# Patient Record
Sex: MALE | Race: WHITE | NOT HISPANIC OR LATINO | Employment: FULL TIME | URBAN - METROPOLITAN AREA
[De-identification: names, ages, dates, MRNs, and addresses within clinical notes are randomized per-mention and may not be internally consistent; named-entity substitution may affect disease eponyms.]

---

## 2017-08-15 ENCOUNTER — OFFICE VISIT (OUTPATIENT)
Dept: FAMILY MEDICINE | Facility: CLINIC | Age: 22
End: 2017-08-15
Payer: COMMERCIAL

## 2017-08-15 VITALS
DIASTOLIC BLOOD PRESSURE: 68 MMHG | OXYGEN SATURATION: 98 % | BODY MASS INDEX: 17.26 KG/M2 | HEIGHT: 71 IN | SYSTOLIC BLOOD PRESSURE: 110 MMHG | TEMPERATURE: 98 F | HEART RATE: 86 BPM | WEIGHT: 123.25 LBS | RESPIRATION RATE: 17 BRPM

## 2017-08-15 DIAGNOSIS — J02.9 PHARYNGITIS, UNSPECIFIED ETIOLOGY: ICD-10-CM

## 2017-08-15 DIAGNOSIS — E78.5 HYPERLIPIDEMIA, UNSPECIFIED HYPERLIPIDEMIA TYPE: Primary | ICD-10-CM

## 2017-08-15 LAB
BASOPHILS # BLD AUTO: 0.03 K/UL
BASOPHILS NFR BLD: 0.6 %
DIFFERENTIAL METHOD: NORMAL
EOSINOPHIL # BLD AUTO: 0.1 K/UL
EOSINOPHIL NFR BLD: 1.2 %
ERYTHROCYTE [DISTWIDTH] IN BLOOD BY AUTOMATED COUNT: 13.7 %
HCT VFR BLD AUTO: 48.2 %
HGB BLD-MCNC: 16 G/DL
LYMPHOCYTES # BLD AUTO: 1.4 K/UL
LYMPHOCYTES NFR BLD: 28.1 %
MCH RBC QN AUTO: 28.7 PG
MCHC RBC AUTO-ENTMCNC: 33.2 G/DL
MCV RBC AUTO: 87 FL
MONOCYTES # BLD AUTO: 0.6 K/UL
MONOCYTES NFR BLD: 12.3 %
NEUTROPHILS # BLD AUTO: 2.8 K/UL
NEUTROPHILS NFR BLD: 57.6 %
PLATELET # BLD AUTO: 279 K/UL
PMV BLD AUTO: 11.4 FL
RBC # BLD AUTO: 5.57 M/UL
WBC # BLD AUTO: 4.81 K/UL

## 2017-08-15 PROCEDURE — 80053 COMPREHEN METABOLIC PANEL: CPT

## 2017-08-15 PROCEDURE — 36415 COLL VENOUS BLD VENIPUNCTURE: CPT | Mod: S$GLB,,, | Performed by: INTERNAL MEDICINE

## 2017-08-15 PROCEDURE — 80061 LIPID PANEL: CPT

## 2017-08-15 PROCEDURE — 3008F BODY MASS INDEX DOCD: CPT | Mod: S$GLB,,, | Performed by: INTERNAL MEDICINE

## 2017-08-15 PROCEDURE — 85025 COMPLETE CBC W/AUTO DIFF WBC: CPT

## 2017-08-15 PROCEDURE — 86695 HERPES SIMPLEX TYPE 1 TEST: CPT

## 2017-08-15 PROCEDURE — 99213 OFFICE O/P EST LOW 20 MIN: CPT | Mod: S$GLB,,, | Performed by: INTERNAL MEDICINE

## 2017-08-15 PROCEDURE — 87070 CULTURE OTHR SPECIMN AEROBIC: CPT

## 2017-08-15 NOTE — PROGRESS NOTES
Subjective:       Patient ID: Juliano Villanueva is a 22 y.o. male.    No current outpatient prescriptions on file.     No current facility-administered medications for this visit.      Chief Complaint: Annual Exam and Pain when swallowing, abx x 5 days  He is here today to an annual exam and discuss his sore throat.     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  He is doing well and tolerated living in [facility] well.      He has mildly elevated lipids checked on 8/2016 were 222/92/45/158. He is not currently on treatment.     He does reports starting with sore throat while still in [facility] 10 days ago. He returned after 4 days to the [facility] and was seen in Urgent Care.  His throat swab was negative but he was given abx to take anyway. He says he has been on abx now for 5 days and had symptoms for 10 days and it is still not improved. He has noticed lesion and sores in his mouth and in his gums. He denies any cold symptoms. No coughing. No ear pain. No fevers.  He has pain with swallowing.  He says it is not getting any better.     Review of Systems   Constitutional: Negative for appetite change, fatigue, fever and unexpected weight change.   HENT: Positive for sore throat. Negative for congestion, ear pain, hearing loss and trouble swallowing.    Eyes: Negative for pain and visual disturbance.   Respiratory: Negative for cough, chest tightness, shortness of breath and wheezing.    Cardiovascular: Negative for chest pain, palpitations and leg swelling.   Gastrointestinal: Negative for abdominal pain, blood in stool, constipation, diarrhea, nausea and vomiting.   Endocrine: Negative for polyuria.   Genitourinary: Negative for dysuria and hematuria.   Musculoskeletal: Negative for arthralgias, back pain and myalgias.   Skin: Negative for rash.   Neurological: Negative for dizziness, weakness, numbness and headaches.   Hematological: Does not bruise/bleed easily.   Psychiatric/Behavioral: Negative for  "dysphoric mood, sleep disturbance and suicidal ideas. The patient is not nervous/anxious.        Objective:      Vitals:    08/15/17 1113   BP: 110/68   BP Location: Right arm   Patient Position: Sitting   BP Method: Medium (Manual)   Pulse: 86   Resp: 17   Temp: 98.2 °F (36.8 °C)   TempSrc: Oral   SpO2: 98%   Weight: 55.9 kg (123 lb 3.8 oz)   Height: 5' 11" (1.803 m)     Body mass index is 17.19 kg/m².  Physical Exam    General appearance: No acute distress, cooperative  Eyes: PERRL, EOMI, conjunctiva clear  Ears: normal external ear and pinna, tm clear without drainage, canals clear  Nose: Normal mucosa without drainage  Throat: no exudates but moderate erythema, tonsils not enlarged  Mouth: ulcers on his tonsils, buccal mucosa (none on lips), Moist mucous membranes  Neck: FROM, soft, supple, no thyromegaly, no bruits  Lymph: tender b/l  anterior adenopathy but no posterior cervical adenopathy  Heart::  Regular rate and rhythm, no murmur  Lung: Clear to ascultation bilaterally, no wheezing, no rales, no rhonchi, no distress  Abdomen: Soft, nontender, no distention, no hepatosplenomegaly, bowel sounds normal, no guarding, no rebound, no peritoneal signs  Skin: no rashes, no lesions  Extremities: no edema, no cyanosis  Neuro: CN 2-12 intact, 5/5 muscle strength upper and lower extremity bilaterally, 2+ DTRs UE and LE bilaterally, normal gait, normal sensation  Peripheral pulses: 2+ pedal pulses bilaterally, good perfusion and color  Musculoskeletal: FROM, good strenth, no tenderness  Joint: normal appearance, no swelling, no warmth, no deformity in all joints    Assessment:       1. Hyperlipidemia, unspecified hyperlipidemia type    2. Pharyngitis, unspecified etiology        Plan:       Hyperlipidemia, unspecified hyperlipidemia type  Noted to be elevated in the past and will recheck today.   -     Lipid panel  -     Elena-Barr Virus antibody panel    Pharyngitis, unspecified etiology  Viral etiology that is not " improving after 10 days. Concern for HSV and EBV.  No lesions on his hands or feet and symptoms not improving after 10 days make coxsackie, adenovirus or enterovius less likely.  Will send culture of tonsils but I think this will be negative.  Okay to stop taking abx.  If no improvement and workup is negative then referral to ENT for laryngoscope.    -     CBC auto differential  -     Comprehensive metabolic panel  -     Herpes simplex type 1&2 IgG,Herpes titer  -     HERPES SIMPLEX 1 & 2 IGM  -     Throat culture    Return if symptoms worsen or fail to improve, for he will call if not improved over the nex couple days.

## 2017-08-16 LAB
ALBUMIN SERPL BCP-MCNC: 4.2 G/DL
ALP SERPL-CCNC: 65 U/L
ALT SERPL W/O P-5'-P-CCNC: 68 U/L
ANION GAP SERPL CALC-SCNC: 10 MMOL/L
AST SERPL-CCNC: 56 U/L
BILIRUB SERPL-MCNC: 2.1 MG/DL
BUN SERPL-MCNC: 10 MG/DL
CALCIUM SERPL-MCNC: 9.5 MG/DL
CHLORIDE SERPL-SCNC: 104 MMOL/L
CHOLEST/HDLC SERPL: 7.6 {RATIO}
CO2 SERPL-SCNC: 26 MMOL/L
CREAT SERPL-MCNC: 0.9 MG/DL
EST. GFR  (AFRICAN AMERICAN): >60 ML/MIN/1.73 M^2
EST. GFR  (NON AFRICAN AMERICAN): >60 ML/MIN/1.73 M^2
GLUCOSE SERPL-MCNC: 80 MG/DL
HDL/CHOLESTEROL RATIO: 13.2 %
HDLC SERPL-MCNC: 287 MG/DL
HDLC SERPL-MCNC: 38 MG/DL
LDLC SERPL CALC-MCNC: 224.6 MG/DL
NONHDLC SERPL-MCNC: 249 MG/DL
POTASSIUM SERPL-SCNC: 4.1 MMOL/L
PROT SERPL-MCNC: 7.8 G/DL
SODIUM SERPL-SCNC: 140 MMOL/L
TRIGL SERPL-MCNC: 122 MG/DL

## 2017-08-18 LAB — BACTERIA THROAT CULT: NORMAL

## 2017-08-21 ENCOUNTER — TELEPHONE (OUTPATIENT)
Dept: FAMILY MEDICINE | Facility: CLINIC | Age: 22
End: 2017-08-21

## 2017-08-21 DIAGNOSIS — R79.89 ELEVATED LFTS: Primary | ICD-10-CM

## 2017-08-21 DIAGNOSIS — E78.5 HYPERLIPIDEMIA, UNSPECIFIED HYPERLIPIDEMIA TYPE: ICD-10-CM

## 2017-08-21 RX ORDER — ATORVASTATIN CALCIUM 20 MG/1
20 TABLET, FILM COATED ORAL DAILY
Qty: 90 TABLET | Refills: 3 | Status: SHIPPED | OUTPATIENT
Start: 2017-08-21 | End: 2018-05-08

## 2017-08-21 NOTE — TELEPHONE ENCOUNTER
I called him with his labs results.     He needs to get a cmp in one week .     He needs fasting labs with cmp in 3 months.     Can you call to schedule please.     Thanks

## 2017-08-22 LAB
HSV1 IGG SERPL QL IA: NEGATIVE
HSV2 IGG SERPL QL IA: NEGATIVE

## 2017-08-28 ENCOUNTER — LAB VISIT (OUTPATIENT)
Dept: LAB | Facility: HOSPITAL | Age: 22
End: 2017-08-28
Attending: INTERNAL MEDICINE
Payer: COMMERCIAL

## 2017-08-28 DIAGNOSIS — R79.89 ELEVATED LFTS: ICD-10-CM

## 2017-08-28 LAB
ALBUMIN SERPL BCP-MCNC: 3.9 G/DL
ALP SERPL-CCNC: 67 U/L
ALT SERPL W/O P-5'-P-CCNC: 31 U/L
ANION GAP SERPL CALC-SCNC: 7 MMOL/L
AST SERPL-CCNC: 25 U/L
BILIRUB SERPL-MCNC: 1.4 MG/DL
BUN SERPL-MCNC: 15 MG/DL
CALCIUM SERPL-MCNC: 9.9 MG/DL
CHLORIDE SERPL-SCNC: 106 MMOL/L
CO2 SERPL-SCNC: 29 MMOL/L
CREAT SERPL-MCNC: 1 MG/DL
EST. GFR  (AFRICAN AMERICAN): >60 ML/MIN/1.73 M^2
EST. GFR  (NON AFRICAN AMERICAN): >60 ML/MIN/1.73 M^2
GLUCOSE SERPL-MCNC: 82 MG/DL
POTASSIUM SERPL-SCNC: 4.7 MMOL/L
PROT SERPL-MCNC: 7.9 G/DL
SODIUM SERPL-SCNC: 142 MMOL/L

## 2017-08-28 PROCEDURE — 36415 COLL VENOUS BLD VENIPUNCTURE: CPT | Mod: PO

## 2017-08-28 PROCEDURE — 80053 COMPREHEN METABOLIC PANEL: CPT

## 2017-08-29 ENCOUNTER — TELEPHONE (OUTPATIENT)
Dept: FAMILY MEDICINE | Facility: CLINIC | Age: 22
End: 2017-08-29

## 2017-08-29 NOTE — TELEPHONE ENCOUNTER
Please let him know that his liver function is back to normal.     Some of his viral labs are still pending (can we check on them).        .Thanks

## 2017-08-30 NOTE — TELEPHONE ENCOUNTER
Spoke to Thelma with lab, states specimens were never sent out for resulting, unable to use frozen serum as it is outside of timeframe (per immunology, [place] will not accept stored specimen). Please advise if patient needs redraw.

## 2017-09-06 ENCOUNTER — TELEPHONE (OUTPATIENT)
Dept: FAMILY MEDICINE | Facility: CLINIC | Age: 22
End: 2017-09-06

## 2017-09-06 NOTE — TELEPHONE ENCOUNTER
----- Message from Torrie Kaplan sent at 9/6/2017  1:30 PM CDT -----  Contact: self  Patient is calling for LAB results. Please call patient at [phone]. Thanks!

## 2017-09-07 NOTE — TELEPHONE ENCOUNTER
I called and left him a message.     Okay his liver function is back to normal.  No evidence of HSV.  His labs for CMV and EBV were not done by the lab.     I feel his illness was viral and am happy that this has passed.  He has no residual problems on his labs.      He needs to be taking atorvastatin for his cholesterol and will recheck in 3 months which is already scheduled.     Thanks

## 2017-09-12 ENCOUNTER — PATIENT MESSAGE (OUTPATIENT)
Dept: FAMILY MEDICINE | Facility: CLINIC | Age: 22
End: 2017-09-12

## 2017-09-12 DIAGNOSIS — B34.9 VIRAL ILLNESS: Primary | ICD-10-CM

## 2017-09-13 ENCOUNTER — LAB VISIT (OUTPATIENT)
Dept: LAB | Facility: HOSPITAL | Age: 22
End: 2017-09-13
Attending: INTERNAL MEDICINE
Payer: COMMERCIAL

## 2017-09-13 DIAGNOSIS — B34.9 VIRAL ILLNESS: ICD-10-CM

## 2017-09-13 LAB
ALBUMIN SERPL BCP-MCNC: 4 G/DL
ALP SERPL-CCNC: 63 U/L
ALT SERPL W/O P-5'-P-CCNC: 32 U/L
ANION GAP SERPL CALC-SCNC: 10 MMOL/L
AST SERPL-CCNC: 28 U/L
BASOPHILS # BLD AUTO: 0.03 K/UL
BASOPHILS NFR BLD: 0.5 %
BILIRUB SERPL-MCNC: 2.8 MG/DL
BUN SERPL-MCNC: 20 MG/DL
CALCIUM SERPL-MCNC: 9.6 MG/DL
CHLORIDE SERPL-SCNC: 102 MMOL/L
CO2 SERPL-SCNC: 27 MMOL/L
CREAT SERPL-MCNC: 1 MG/DL
DIFFERENTIAL METHOD: NORMAL
EOSINOPHIL # BLD AUTO: 0.2 K/UL
EOSINOPHIL NFR BLD: 3.1 %
ERYTHROCYTE [DISTWIDTH] IN BLOOD BY AUTOMATED COUNT: 13.1 %
EST. GFR  (AFRICAN AMERICAN): >60 ML/MIN/1.73 M^2
EST. GFR  (NON AFRICAN AMERICAN): >60 ML/MIN/1.73 M^2
GLUCOSE SERPL-MCNC: 105 MG/DL
HCT VFR BLD AUTO: 42.2 %
HGB BLD-MCNC: 14.5 G/DL
LYMPHOCYTES # BLD AUTO: 1.8 K/UL
LYMPHOCYTES NFR BLD: 27.1 %
MCH RBC QN AUTO: 28.6 PG
MCHC RBC AUTO-ENTMCNC: 34.4 G/DL
MCV RBC AUTO: 83 FL
MONOCYTES # BLD AUTO: 0.7 K/UL
MONOCYTES NFR BLD: 10.8 %
NEUTROPHILS # BLD AUTO: 3.8 K/UL
NEUTROPHILS NFR BLD: 58 %
PLATELET # BLD AUTO: 215 K/UL
PMV BLD AUTO: 12.3 FL
POTASSIUM SERPL-SCNC: 4.1 MMOL/L
PROT SERPL-MCNC: 7.2 G/DL
RBC # BLD AUTO: 5.07 M/UL
SODIUM SERPL-SCNC: 139 MMOL/L
WBC # BLD AUTO: 6.5 K/UL

## 2017-09-13 PROCEDURE — 85025 COMPLETE CBC W/AUTO DIFF WBC: CPT

## 2017-09-13 PROCEDURE — 36415 COLL VENOUS BLD VENIPUNCTURE: CPT | Mod: PO

## 2017-09-13 PROCEDURE — 86665 EPSTEIN-BARR CAPSID VCA: CPT

## 2017-09-13 PROCEDURE — 80053 COMPREHEN METABOLIC PANEL: CPT

## 2017-09-15 ENCOUNTER — PATIENT MESSAGE (OUTPATIENT)
Dept: FAMILY MEDICINE | Facility: CLINIC | Age: 22
End: 2017-09-15

## 2017-09-15 LAB
EBV EA AB TITR SER: 81.2 U/ML
EBV NA IGG SER QL: <3 U/ML
EBV VCA IGG SER QL: 105 U/ML
EBV VCA IGM SER-ACNC: 68.1 U/ML

## 2017-09-20 ENCOUNTER — OFFICE VISIT (OUTPATIENT)
Dept: FAMILY MEDICINE | Facility: CLINIC | Age: 22
End: 2017-09-20
Payer: COMMERCIAL

## 2017-09-20 VITALS
SYSTOLIC BLOOD PRESSURE: 106 MMHG | WEIGHT: 126.19 LBS | HEART RATE: 67 BPM | HEIGHT: 71 IN | OXYGEN SATURATION: 99 % | BODY MASS INDEX: 17.67 KG/M2 | DIASTOLIC BLOOD PRESSURE: 65 MMHG | TEMPERATURE: 98 F | RESPIRATION RATE: 17 BRPM

## 2017-09-20 DIAGNOSIS — B27.00 ACUTE EPSTEIN BARR VIRUS (EBV) INFECTION: Primary | ICD-10-CM

## 2017-09-20 PROCEDURE — 99213 OFFICE O/P EST LOW 20 MIN: CPT | Mod: S$GLB,,, | Performed by: INTERNAL MEDICINE

## 2017-09-20 PROCEDURE — 3008F BODY MASS INDEX DOCD: CPT | Mod: S$GLB,,, | Performed by: INTERNAL MEDICINE

## 2017-09-20 NOTE — PROGRESS NOTES
Subjective:       Patient ID: Juliano Villanueva is a 22 y.o. male.    Current Outpatient Prescriptions   Medication Sig Dispense Refill    atorvastatin (LIPITOR) 20 MG tablet Take 1 tablet (20 mg total) by mouth once daily. 90 tablet 3     No current facility-administered medications for this visit.      Chief Complaint: Follow up, lab results  He is here to f/u on his acute EBV infection.  He was noted to have mildly elevated LFTs but this has resolved back to normal.  He is feeling well without any complaints today. His sore throat has resolved. He is fever free. No coughing or congestion. He denies any abdominal pain. No vomiting or diarrhea. He says his energy level is normal.      Review of Systems   Constitutional: Negative for activity change, appetite change, chills, fatigue, fever and unexpected weight change.   HENT: Positive for rhinorrhea. Negative for congestion, ear discharge, ear pain, hearing loss, mouth sores, postnasal drip, sinus pressure, sore throat and trouble swallowing.    Eyes: Negative for pain, discharge, redness and visual disturbance.   Respiratory: Negative for apnea, cough, chest tightness, shortness of breath and wheezing.    Cardiovascular: Negative for chest pain and palpitations.   Gastrointestinal: Negative for abdominal pain, blood in stool, constipation, diarrhea, nausea and vomiting.   Endocrine: Negative for polydipsia and polyuria.   Genitourinary: Negative for difficulty urinating, dysuria, hematuria and urgency.   Musculoskeletal: Negative for arthralgias, joint swelling, neck pain and neck stiffness.   Skin: Negative for rash.   Neurological: Negative for weakness and headaches.   Hematological: Negative for adenopathy.   Psychiatric/Behavioral: Negative for confusion and dysphoric mood.       Objective:      Vitals:    09/20/17 0925   BP: 106/65   BP Location: Right arm   Patient Position: Sitting   BP Method: Large (Manual)   Pulse: 67   Resp: 17   Temp: 98.4 °F  "(36.9 °C)   TempSrc: Oral   SpO2: 99%   Weight: 57.2 kg (126 lb 3.2 oz)   Height: 5' 11" (1.803 m)     Body mass index is 17.6 kg/m².  Physical Exam    General appearance: alert, no acute distress  Head: atraumatic  Eyes: PERRL, EMOI, normal conjunctiva, no drainage  Ears: tm normal with good visualization of landmarks, no erythema or pus, canals normal, external ear normal  Nose: normal mucosa, no polyps or sores, no rhinorrhea  Throat: no erythema, no exudates, tonsils appear normal  Mouth: no sores or lesion, moist mucous membranes  Neck: supple, FROM, no masses, no tenderness  Lymph: non-tender left posterior lymph nodes, no cervical adenopathy, no supraclavicular or axillary adenopathy  Lungs: no distress, no retractions, clear to ascultation bilaterally, no wheezing, no rales, no rhonchi  Heart:: Regular rate and rhythm, no murmur  Abdomen: soft, non-tender, no guarding, no rebound, no peritoneal signs, bowel sounds normal, no hepatosplenomegaly, no masses  Skin: no rashes or lesion  Perfusion: good capillary refill, normal pulses      Assessment:       1. Acute Elena Barr virus (EBV) infection        Plan:       Acute Elena Barr virus (EBV) infection  His symptoms has resolved and he is doing well. Reassurance was given.  Advised of the signs to return to clinic.     Return if symptoms worsen or fail to improve.      "

## 2018-04-15 ENCOUNTER — PATIENT MESSAGE (OUTPATIENT)
Dept: FAMILY MEDICINE | Facility: CLINIC | Age: 23
End: 2018-04-15

## 2018-04-16 NOTE — TELEPHONE ENCOUNTER
Please contact Chago Ventura (head of distribution for ambulatory pharm) to see if we can get a single dose of rabies in the clinic for early June.  I called Mariana and this is the route she suggested. She says start now so that it is ready for early June when Juliano returns from [facility].      Then we can make an appt for his patient to be seen and get vaccine.     Juliano is only available by email--he is in [facility].      Thanks

## 2018-05-04 ENCOUNTER — TELEPHONE (OUTPATIENT)
Dept: FAMILY MEDICINE | Facility: CLINIC | Age: 23
End: 2018-05-04

## 2018-05-04 NOTE — TELEPHONE ENCOUNTER
Pt returns call to clinic. [place] is back in the [place] and was supposed to get his 4th rabies shot on may 8 in [facility]. Can we use the one we ordered for June 8 for the May 8th appointment and order another for  June? Pt is scheduled for Tuesday as requested. Questioning that the 5th dose that was ordered is the same dosing amount as the 4th shot.

## 2018-05-04 NOTE — TELEPHONE ENCOUNTER
----- Message from Lorena Kim sent at 5/2/2018  3:34 PM CDT -----  Contact: pt  Pt states needs to get 4 shot on 5/8/coming back in town/ needs a callback  to make sure have the medication. Have scheduled the 5/8 appointment..[phone] (home)

## 2018-05-08 ENCOUNTER — OFFICE VISIT (OUTPATIENT)
Dept: FAMILY MEDICINE | Facility: CLINIC | Age: 23
End: 2018-05-08
Payer: COMMERCIAL

## 2018-05-08 VITALS
TEMPERATURE: 98 F | OXYGEN SATURATION: 96 % | DIASTOLIC BLOOD PRESSURE: 70 MMHG | HEART RATE: 94 BPM | BODY MASS INDEX: 16.88 KG/M2 | SYSTOLIC BLOOD PRESSURE: 112 MMHG | HEIGHT: 71 IN | WEIGHT: 120.56 LBS | RESPIRATION RATE: 16 BRPM

## 2018-05-08 DIAGNOSIS — Z20.3 CONTACT WITH AND SUSPECTED EXPOSURE TO RABIES: Primary | ICD-10-CM

## 2018-05-08 DIAGNOSIS — J30.9 ALLERGIC RHINITIS, UNSPECIFIED SEASONALITY, UNSPECIFIED TRIGGER: ICD-10-CM

## 2018-05-08 PROCEDURE — 99214 OFFICE O/P EST MOD 30 MIN: CPT | Mod: 25,S$GLB,, | Performed by: NURSE PRACTITIONER

## 2018-05-08 PROCEDURE — 90471 IMMUNIZATION ADMIN: CPT | Mod: S$GLB,,, | Performed by: INTERNAL MEDICINE

## 2018-05-08 PROCEDURE — 90675 RABIES VACCINE IM: CPT | Mod: S$GLB,,, | Performed by: INTERNAL MEDICINE

## 2018-05-08 PROCEDURE — 3008F BODY MASS INDEX DOCD: CPT | Mod: CPTII,S$GLB,, | Performed by: NURSE PRACTITIONER

## 2018-05-08 NOTE — PROGRESS NOTES
"Subjective:       Patient ID: Juliano Villanueva is a 23 y.o. male.    Chief Complaint: Follow-up (needs rabies vaccine / bitten by monkey 1 mth ago )    HPI New patient to me. He is here for his rabies injection. He was bitten by a monkey. This will be his 4th injection. The other 3 were given on. 4/15/18, 4/18/18, 4/24/18. His 4th dose will be today 5/8/18.     For the past year having some episodes of lightheadedness, dizzy and nausea.   States Saturday felt dizzy, weak and felt like the room was spinning. States lasted about 15 seconds and then was fine. He had eaten. Hydrating well. Had been having some pressure in his ears and runny nose. He denies headaches. He denies any other symptoms. See ROS.    The following portion of the patients history was reviewed and updated as appropriate: allergies, current medications, past medical and surgical history. Past social history and problem list reviewed. Family PMH and Past social history reviewed. Tobacco, Illicit drug use reviewed.     Review of Systems   Constitutional: Negative for fatigue and fever.   HENT: Positive for ear pain (pressure off and on), postnasal drip and rhinorrhea. Negative for congestion, sinus pain, sinus pressure and sore throat.    Eyes: Negative for visual disturbance.   Respiratory: Negative for cough, shortness of breath and wheezing.    Cardiovascular: Negative for chest pain and palpitations.   Gastrointestinal: Negative for abdominal pain, constipation, diarrhea, nausea and vomiting.   Musculoskeletal: Negative.    Neurological: Negative for dizziness, weakness, light-headedness and headaches.        Has episodes of this off and on, lightheaded and dizzy if he turns quickly.        Objective:     /70   Pulse 94   Temp 98.3 °F (36.8 °C) (Oral)   Resp 16   Ht 5' 11" (1.803 m)   Wt 54.7 kg (120 lb 9.5 oz)   SpO2 96%   BMI 16.82 kg/m²      Physical Exam    Constitutional: oriented to person, place, and time. well-developed " and well-nourished.   HENT: normal throat, no erythema or exudate. Canals clear. TM dull, fluid present. No indication of infection.   Head: Normocephalic.   Eyes: Conjunctivae are normal. Pupils are equal, round, and reactive to light.   Cardiovascular: Normal rate, regular rhythm and normal heart sounds.    Pulmonary/Chest: Effort normal and breath sounds normal. No respiratory distress. No wheezes.   Musculoskeletal: Normal range of motion.  gait and coordination normal  Assessment:       1. Contact with and suspected exposure to rabies    2. Allergic rhinitis, unspecified seasonality, unspecified trigger        Plan:         Juliano Hardy was seen today for follow-up.    Diagnoses and all orders for this visit:    Contact with and suspected exposure to rabies: he will get his 4th dose today.   -     (In Office Administered) Rabies Vaccine (IM)    Allergic rhinitis, unspecified seasonality, unspecified trigger: start on flonase daily and claritin daily. If symptoms not improving then will need additional work up.     Continue current medication  Take medications only as prescribed  Healthy diet, exercise  Adequate rest  Adequate hydration  Avoid allergens  Avoid excessive caffeine

## 2018-05-22 ENCOUNTER — PATIENT MESSAGE (OUTPATIENT)
Dept: FAMILY MEDICINE | Facility: CLINIC | Age: 23
End: 2018-05-22

## 2018-07-31 ENCOUNTER — OFFICE VISIT (OUTPATIENT)
Dept: FAMILY MEDICINE | Facility: CLINIC | Age: 23
End: 2018-07-31
Payer: COMMERCIAL

## 2018-07-31 VITALS
HEIGHT: 71 IN | TEMPERATURE: 98 F | DIASTOLIC BLOOD PRESSURE: 66 MMHG | BODY MASS INDEX: 16.13 KG/M2 | HEART RATE: 100 BPM | SYSTOLIC BLOOD PRESSURE: 100 MMHG | WEIGHT: 115.19 LBS

## 2018-07-31 DIAGNOSIS — J01.00 ACUTE MAXILLARY SINUSITIS, RECURRENCE NOT SPECIFIED: Primary | ICD-10-CM

## 2018-07-31 DIAGNOSIS — K59.00 CONSTIPATION, UNSPECIFIED CONSTIPATION TYPE: ICD-10-CM

## 2018-07-31 DIAGNOSIS — R53.83 FATIGUE, UNSPECIFIED TYPE: ICD-10-CM

## 2018-07-31 DIAGNOSIS — J02.9 SORE THROAT: ICD-10-CM

## 2018-07-31 LAB
CTP QC/QA: YES
S PYO RRNA THROAT QL PROBE: NEGATIVE

## 2018-07-31 PROCEDURE — 87880 STREP A ASSAY W/OPTIC: CPT | Mod: QW,,, | Performed by: NURSE PRACTITIONER

## 2018-07-31 PROCEDURE — 3008F BODY MASS INDEX DOCD: CPT | Mod: CPTII,S$GLB,, | Performed by: NURSE PRACTITIONER

## 2018-07-31 PROCEDURE — 99214 OFFICE O/P EST MOD 30 MIN: CPT | Mod: S$GLB,,, | Performed by: NURSE PRACTITIONER

## 2018-07-31 RX ORDER — AMOXICILLIN 500 MG/1
500 TABLET, FILM COATED ORAL EVERY 12 HOURS
Qty: 14 TABLET | Refills: 0 | Status: SHIPPED | OUTPATIENT
Start: 2018-07-31 | End: 2018-08-07

## 2018-07-31 NOTE — PROGRESS NOTES
"Subjective:       Patient ID: Juliano Villanueva is a 23 y.o. male.    Chief Complaint: Cough; Sore Throat; and Constipation    HPI onset over the past week with fatigue, decreased appetite, sinus congestion, postnasal drip and sore throat.  Sinus area pain worse on the left side.  States productive cough with yellow-green mucus.  Achy all over and sinus pressure headache.  He has taken over-the-counter Motrin and sinus decongestant with mild relief.  Denies any wheezing or shortness of breath.  States he has not had a bowel movement in the past 6 days.  States this happens off and on.  He does not take any medication such as stool softeners or laxatives.  States his diet has not changed.  Denies any stomach pain but does feel bloated.  He has not noticed any fever.  See review of systems.    The following portion of the patients history was reviewed and updated as appropriate: allergies, current medications, past medical and surgical history. Past social history and problem list reviewed. Family PMH and Past social history reviewed. Tobacco, Illicit drug use reviewed.     Review of Systems   Constitutional: Positive for appetite change (decreased appetite) and fatigue. Negative for fever.   HENT: Positive for congestion, postnasal drip, sinus pain, sinus pressure and sore throat.    Eyes: Negative for visual disturbance.   Respiratory: Positive for cough (yellow/green mucous). Negative for shortness of breath and wheezing.    Cardiovascular: Negative for chest pain and palpitations.   Gastrointestinal: Positive for constipation (going sometimes 6 days without BM. this is chronic.). Negative for abdominal pain, blood in stool, diarrhea, nausea and vomiting.   Musculoskeletal: Positive for myalgias. Negative for gait problem.   Neurological: Positive for headaches.       Objective:     /66   Pulse 100   Temp 97.8 °F (36.6 °C) (Oral)   Ht 5' 11" (1.803 m)   Wt 52.3 kg (115 lb 3.2 oz)   BMI 16.07 kg/m²  "     Physical Exam  Constitutional:  well-developed and well-nourished. Oriented to Person, place and time.  Head: Normocephalic.   Ears: Canals without erythema or cerumen impactions. Mild air and /fluid levels. No bulging bilaterally.  Nose: Rhinorrhea and sinus tenderness present over left maxillary sinus area.   Mouth/Throat: Uvula is midline and mucous membranes are normal. Posterior oropharyngeal erythema present. PND to posterior pharynx.   Eyes: Conjunctivae are normal. Pupils are equal, round, and reactive to light.   Neck: Normal range of motion. Neck supple. mild inflammation and tenderness to anterior cervical lymph nodes  Cardiovascular: Normal rate and regular rhythm.  No murmurs or gallops.   Pulmonary/Chest: Effort normal and breath sounds normal.  no wheezing or rales.   Musculoskeletal: Normal range of motion. gait and coordination normal.   Assessment:       1. Acute maxillary sinusitis, recurrence not specified    2. Sore throat    3. Fatigue, unspecified type    4. Constipation, unspecified constipation type        Plan:         Juliano Hardy was seen today for cough, sore throat and constipation.    Diagnoses and all orders for this visit:    Acute maxillary sinusitis, recurrence not specified: due to duration and presenting exam will cover with antibiotics. Take as directed.    Sore throat: strep was negative.  -     POCT Rapid Strep A    Fatigue, unspecified type:    Mono negative.  -     POCT Infectious mononucleosis antibody    Constipation, unspecified constipation type: needs to hydrate well. Can take colace stool softeners or miralax prn. If not helping, will refer to GI clinic.    Other orders  -     amoxicillin (AMOXIL) 500 MG Tab; Take 1 tablet (500 mg total) by mouth every 12 (twelve) hours. for 7 days    Continue current medication  Take medications only as prescribed  Healthy diet  Adequate rest  Adequate hydration  Avoid allergens  Avoid excessive caffeine